# Patient Record
Sex: FEMALE | Race: OTHER | Employment: OTHER | URBAN - METROPOLITAN AREA
[De-identification: names, ages, dates, MRNs, and addresses within clinical notes are randomized per-mention and may not be internally consistent; named-entity substitution may affect disease eponyms.]

---

## 2023-05-01 ENCOUNTER — HOSPITAL ENCOUNTER (INPATIENT)
Age: 73
LOS: 2 days | Discharge: HOME | DRG: 641 | End: 2023-05-03
Attending: EMERGENCY MEDICINE | Admitting: INTERNAL MEDICINE
Payer: MEDICARE

## 2023-05-01 ENCOUNTER — APPOINTMENT (OUTPATIENT)
Dept: MRI IMAGING | Age: 73
DRG: 641 | End: 2023-05-01
Payer: MEDICARE

## 2023-05-01 ENCOUNTER — APPOINTMENT (OUTPATIENT)
Dept: CT IMAGING | Age: 73
DRG: 641 | End: 2023-05-01
Payer: MEDICARE

## 2023-05-01 ENCOUNTER — APPOINTMENT (OUTPATIENT)
Dept: GENERAL RADIOLOGY | Age: 73
DRG: 641 | End: 2023-05-01
Payer: MEDICARE

## 2023-05-01 DIAGNOSIS — R42 VERTIGO: Primary | ICD-10-CM

## 2023-05-01 DIAGNOSIS — R27.0 ATAXIA: ICD-10-CM

## 2023-05-01 PROBLEM — R07.9 CHEST PAIN: Status: ACTIVE | Noted: 2023-05-01

## 2023-05-01 LAB
ALBUMIN SERPL-MCNC: 4.1 G/DL (ref 3.4–5)
ALP SERPL-CCNC: 86 U/L (ref 40–129)
ALT SERPL-CCNC: 20 U/L (ref 10–40)
ANION GAP SERPL CALCULATED.3IONS-SCNC: 12 MMOL/L (ref 3–16)
AST SERPL-CCNC: 26 U/L (ref 15–37)
BASOPHILS # BLD: 0 K/UL (ref 0–0.2)
BASOPHILS NFR BLD: 0.4 %
BILIRUB DIRECT SERPL-MCNC: <0.2 MG/DL (ref 0–0.3)
BILIRUB INDIRECT SERPL-MCNC: NORMAL MG/DL (ref 0–1)
BILIRUB SERPL-MCNC: <0.2 MG/DL (ref 0–1)
BUN SERPL-MCNC: 23 MG/DL (ref 7–20)
CALCIUM SERPL-MCNC: 9.2 MG/DL (ref 8.3–10.6)
CHLORIDE SERPL-SCNC: 104 MMOL/L (ref 99–110)
CO2 SERPL-SCNC: 25 MMOL/L (ref 21–32)
CREAT SERPL-MCNC: 0.7 MG/DL (ref 0.6–1.2)
CRP SERPL-MCNC: <3 MG/L (ref 0–5.1)
DEPRECATED RDW RBC AUTO: 18.1 % (ref 12.4–15.4)
EKG ATRIAL RATE: 80 BPM
EKG DIAGNOSIS: NORMAL
EKG P AXIS: 73 DEGREES
EKG P-R INTERVAL: 198 MS
EKG Q-T INTERVAL: 408 MS
EKG QRS DURATION: 100 MS
EKG QTC CALCULATION (BAZETT): 470 MS
EKG R AXIS: 43 DEGREES
EKG T AXIS: 57 DEGREES
EKG VENTRICULAR RATE: 80 BPM
EOSINOPHIL # BLD: 0.3 K/UL (ref 0–0.6)
EOSINOPHIL NFR BLD: 2.9 %
GFR SERPLBLD CREATININE-BSD FMLA CKD-EPI: >60 ML/MIN/{1.73_M2}
GLUCOSE SERPL-MCNC: 121 MG/DL (ref 70–99)
HCT VFR BLD AUTO: 37.3 % (ref 36–48)
HGB BLD-MCNC: 12.6 G/DL (ref 12–16)
LIPASE SERPL-CCNC: 87 U/L (ref 13–60)
LYMPHOCYTES # BLD: 1.4 K/UL (ref 1–5.1)
LYMPHOCYTES NFR BLD: 15.1 %
MAGNESIUM SERPL-MCNC: 2.1 MG/DL (ref 1.8–2.4)
MCH RBC QN AUTO: 31.1 PG (ref 26–34)
MCHC RBC AUTO-ENTMCNC: 33.9 G/DL (ref 31–36)
MCV RBC AUTO: 92 FL (ref 80–100)
MONOCYTES # BLD: 0.6 K/UL (ref 0–1.3)
MONOCYTES NFR BLD: 6.2 %
NEUTROPHILS # BLD: 7.1 K/UL (ref 1.7–7.7)
NEUTROPHILS NFR BLD: 75.4 %
PHOSPHATE SERPL-MCNC: 3.6 MG/DL (ref 2.5–4.9)
PLATELET # BLD AUTO: 540 K/UL (ref 135–450)
PMV BLD AUTO: 7.9 FL (ref 5–10.5)
POTASSIUM SERPL-SCNC: 4.1 MMOL/L (ref 3.5–5.1)
PROT SERPL-MCNC: 6.7 G/DL (ref 6.4–8.2)
RBC # BLD AUTO: 4.05 M/UL (ref 4–5.2)
SODIUM SERPL-SCNC: 141 MMOL/L (ref 136–145)
TROPONIN, HIGH SENSITIVITY: 10 NG/L (ref 0–14)
TROPONIN, HIGH SENSITIVITY: 10 NG/L (ref 0–14)
TROPONIN, HIGH SENSITIVITY: 9 NG/L (ref 0–14)
WBC # BLD AUTO: 9.3 K/UL (ref 4–11)

## 2023-05-01 PROCEDURE — 93005 ELECTROCARDIOGRAM TRACING: CPT | Performed by: EMERGENCY MEDICINE

## 2023-05-01 PROCEDURE — 70450 CT HEAD/BRAIN W/O DYE: CPT

## 2023-05-01 PROCEDURE — 6360000002 HC RX W HCPCS

## 2023-05-01 PROCEDURE — 99285 EMERGENCY DEPT VISIT HI MDM: CPT

## 2023-05-01 PROCEDURE — 83735 ASSAY OF MAGNESIUM: CPT

## 2023-05-01 PROCEDURE — 80048 BASIC METABOLIC PNL TOTAL CA: CPT

## 2023-05-01 PROCEDURE — 6370000000 HC RX 637 (ALT 250 FOR IP)

## 2023-05-01 PROCEDURE — 86140 C-REACTIVE PROTEIN: CPT

## 2023-05-01 PROCEDURE — 80076 HEPATIC FUNCTION PANEL: CPT

## 2023-05-01 PROCEDURE — 84100 ASSAY OF PHOSPHORUS: CPT

## 2023-05-01 PROCEDURE — 36415 COLL VENOUS BLD VENIPUNCTURE: CPT

## 2023-05-01 PROCEDURE — 70551 MRI BRAIN STEM W/O DYE: CPT

## 2023-05-01 PROCEDURE — 83690 ASSAY OF LIPASE: CPT

## 2023-05-01 PROCEDURE — 85025 COMPLETE CBC W/AUTO DIFF WBC: CPT

## 2023-05-01 PROCEDURE — 6360000004 HC RX CONTRAST MEDICATION: Performed by: EMERGENCY MEDICINE

## 2023-05-01 PROCEDURE — 84484 ASSAY OF TROPONIN QUANT: CPT

## 2023-05-01 PROCEDURE — 6370000000 HC RX 637 (ALT 250 FOR IP): Performed by: EMERGENCY MEDICINE

## 2023-05-01 PROCEDURE — 70498 CT ANGIOGRAPHY NECK: CPT

## 2023-05-01 PROCEDURE — 2060000000 HC ICU INTERMEDIATE R&B

## 2023-05-01 PROCEDURE — 2580000003 HC RX 258

## 2023-05-01 PROCEDURE — 71046 X-RAY EXAM CHEST 2 VIEWS: CPT

## 2023-05-01 RX ORDER — 0.9 % SODIUM CHLORIDE 0.9 %
1000 INTRAVENOUS SOLUTION INTRAVENOUS ONCE
Status: COMPLETED | OUTPATIENT
Start: 2023-05-01 | End: 2023-05-01

## 2023-05-01 RX ORDER — SODIUM CHLORIDE 9 MG/ML
INJECTION, SOLUTION INTRAVENOUS CONTINUOUS
Status: DISCONTINUED | OUTPATIENT
Start: 2023-05-01 | End: 2023-05-01

## 2023-05-01 RX ORDER — ONDANSETRON 2 MG/ML
4 INJECTION INTRAMUSCULAR; INTRAVENOUS EVERY 6 HOURS PRN
Status: DISCONTINUED | OUTPATIENT
Start: 2023-05-01 | End: 2023-05-03 | Stop reason: HOSPADM

## 2023-05-01 RX ORDER — SODIUM CHLORIDE 0.9 % (FLUSH) 0.9 %
5-40 SYRINGE (ML) INJECTION EVERY 12 HOURS SCHEDULED
Status: DISCONTINUED | OUTPATIENT
Start: 2023-05-01 | End: 2023-05-03 | Stop reason: HOSPADM

## 2023-05-01 RX ORDER — ONDANSETRON 4 MG/1
4 TABLET, ORALLY DISINTEGRATING ORAL EVERY 8 HOURS PRN
Status: DISCONTINUED | OUTPATIENT
Start: 2023-05-01 | End: 2023-05-03 | Stop reason: HOSPADM

## 2023-05-01 RX ORDER — MECLIZINE HYDROCHLORIDE 25 MG/1
25 TABLET ORAL ONCE
Status: COMPLETED | OUTPATIENT
Start: 2023-05-01 | End: 2023-05-01

## 2023-05-01 RX ORDER — PANTOPRAZOLE SODIUM 40 MG/1
40 TABLET, DELAYED RELEASE ORAL
Status: DISCONTINUED | OUTPATIENT
Start: 2023-05-02 | End: 2023-05-03 | Stop reason: HOSPADM

## 2023-05-01 RX ORDER — ENOXAPARIN SODIUM 100 MG/ML
30 INJECTION SUBCUTANEOUS 2 TIMES DAILY
Status: DISCONTINUED | OUTPATIENT
Start: 2023-05-01 | End: 2023-05-03 | Stop reason: HOSPADM

## 2023-05-01 RX ORDER — MECOBALAMIN 5000 MCG
5 TABLET,DISINTEGRATING ORAL ONCE
Status: COMPLETED | OUTPATIENT
Start: 2023-05-01 | End: 2023-05-01

## 2023-05-01 RX ORDER — HYDROXYUREA 500 MG/1
1000 CAPSULE ORAL DAILY
Status: DISCONTINUED | OUTPATIENT
Start: 2023-05-02 | End: 2023-05-03 | Stop reason: HOSPADM

## 2023-05-01 RX ORDER — SODIUM CHLORIDE 9 MG/ML
INJECTION, SOLUTION INTRAVENOUS PRN
Status: DISCONTINUED | OUTPATIENT
Start: 2023-05-01 | End: 2023-05-03 | Stop reason: HOSPADM

## 2023-05-01 RX ORDER — POLYETHYLENE GLYCOL 3350 17 G/17G
17 POWDER, FOR SOLUTION ORAL DAILY PRN
Status: DISCONTINUED | OUTPATIENT
Start: 2023-05-01 | End: 2023-05-03 | Stop reason: HOSPADM

## 2023-05-01 RX ORDER — SODIUM CHLORIDE 0.9 % (FLUSH) 0.9 %
5-40 SYRINGE (ML) INJECTION PRN
Status: DISCONTINUED | OUTPATIENT
Start: 2023-05-01 | End: 2023-05-03 | Stop reason: HOSPADM

## 2023-05-01 RX ORDER — LISINOPRIL 10 MG/1
10 TABLET ORAL DAILY
Status: DISCONTINUED | OUTPATIENT
Start: 2023-05-02 | End: 2023-05-03 | Stop reason: HOSPADM

## 2023-05-01 RX ORDER — ACETAMINOPHEN 325 MG/1
650 TABLET ORAL EVERY 6 HOURS PRN
Status: DISCONTINUED | OUTPATIENT
Start: 2023-05-01 | End: 2023-05-03 | Stop reason: HOSPADM

## 2023-05-01 RX ORDER — ACETAMINOPHEN 650 MG/1
650 SUPPOSITORY RECTAL EVERY 6 HOURS PRN
Status: DISCONTINUED | OUTPATIENT
Start: 2023-05-01 | End: 2023-05-03 | Stop reason: HOSPADM

## 2023-05-01 RX ORDER — SODIUM CHLORIDE 9 MG/ML
INJECTION, SOLUTION INTRAVENOUS CONTINUOUS
Status: ACTIVE | OUTPATIENT
Start: 2023-05-01 | End: 2023-05-02

## 2023-05-01 RX ORDER — ENOXAPARIN SODIUM 100 MG/ML
40 INJECTION SUBCUTANEOUS DAILY
Status: DISCONTINUED | OUTPATIENT
Start: 2023-05-01 | End: 2023-05-01 | Stop reason: DRUGHIGH

## 2023-05-01 RX ORDER — LISINOPRIL 10 MG/1
10 TABLET ORAL DAILY
COMMUNITY

## 2023-05-01 RX ADMIN — SODIUM CHLORIDE 1000 ML: 9 INJECTION, SOLUTION INTRAVENOUS at 12:33

## 2023-05-01 RX ADMIN — SODIUM CHLORIDE, PRESERVATIVE FREE 10 ML: 5 INJECTION INTRAVENOUS at 21:43

## 2023-05-01 RX ADMIN — ENOXAPARIN SODIUM 30 MG: 100 INJECTION SUBCUTANEOUS at 21:42

## 2023-05-01 RX ADMIN — ACETAMINOPHEN 650 MG: 325 TABLET ORAL at 21:43

## 2023-05-01 RX ADMIN — SODIUM CHLORIDE: 9 INJECTION, SOLUTION INTRAVENOUS at 16:09

## 2023-05-01 RX ADMIN — IOPAMIDOL 75 ML: 755 INJECTION, SOLUTION INTRAVENOUS at 05:38

## 2023-05-01 RX ADMIN — MECLIZINE HYDROCHLORIDE 25 MG: 25 TABLET ORAL at 05:54

## 2023-05-01 RX ADMIN — ENOXAPARIN SODIUM 30 MG: 100 INJECTION SUBCUTANEOUS at 16:05

## 2023-05-01 RX ADMIN — SODIUM CHLORIDE, PRESERVATIVE FREE 10 ML: 5 INJECTION INTRAVENOUS at 12:31

## 2023-05-01 RX ADMIN — Medication 5 MG: at 23:04

## 2023-05-01 ASSESSMENT — PAIN DESCRIPTION - LOCATION
LOCATION: HEAD
LOCATION: HEAD

## 2023-05-01 ASSESSMENT — ENCOUNTER SYMPTOMS
NAUSEA: 1
RESPIRATORY NEGATIVE: 1
EYES NEGATIVE: 1
ANAL BLEEDING: 0

## 2023-05-01 ASSESSMENT — PAIN DESCRIPTION - ORIENTATION
ORIENTATION: MID
ORIENTATION: MID

## 2023-05-01 ASSESSMENT — PAIN DESCRIPTION - DESCRIPTORS
DESCRIPTORS: ACHING
DESCRIPTORS: ACHING

## 2023-05-01 ASSESSMENT — PAIN DESCRIPTION - PAIN TYPE
TYPE: ACUTE PAIN
TYPE: ACUTE PAIN

## 2023-05-01 ASSESSMENT — PAIN - FUNCTIONAL ASSESSMENT
PAIN_FUNCTIONAL_ASSESSMENT: ACTIVITIES ARE NOT PREVENTED
PAIN_FUNCTIONAL_ASSESSMENT: ACTIVITIES ARE NOT PREVENTED
PAIN_FUNCTIONAL_ASSESSMENT: 0-10

## 2023-05-01 ASSESSMENT — LIFESTYLE VARIABLES
HOW MANY STANDARD DRINKS CONTAINING ALCOHOL DO YOU HAVE ON A TYPICAL DAY: PATIENT DOES NOT DRINK
HOW OFTEN DO YOU HAVE A DRINK CONTAINING ALCOHOL: NEVER

## 2023-05-01 ASSESSMENT — PAIN SCALES - GENERAL
PAINLEVEL_OUTOF10: 3
PAINLEVEL_OUTOF10: 5
PAINLEVEL_OUTOF10: 0

## 2023-05-01 ASSESSMENT — PAIN DESCRIPTION - ONSET
ONSET: GRADUAL
ONSET: GRADUAL

## 2023-05-01 ASSESSMENT — PAIN DESCRIPTION - FREQUENCY
FREQUENCY: INTERMITTENT
FREQUENCY: INTERMITTENT

## 2023-05-01 NOTE — H&P
Internal Medicine - History & Physical     Date:  2023   Patient: Avril Amaral   Patient : 1950      CC:  Dizziness    Subjective     History Obtained From:  patient, electronic medical record    HPI:  Avril Amaral is a 72 y.o. female who presented to the ED on 2023 due to dizziness. She has a PMHx notable for HTN, GERD, and thrombocytosis on hydroxyurea. Patient is Citizen of [place] speaking and does understand some English and can communicate some in [place]. Son in the room helped translated some additional words and phrases for patient understanding. Patient presented due to 2 days of dizziness. She came into town on Friday from [place] to visit her son and his family. At that time she was asymptomatic. At baseline, patient does not have any dizziness. She works in [employer] at [employer] in [place] for about 4-5hrs / day and lives independently. She does not feel like the room is spinning. The dizziness is most pronounced when she is getting up from a lying position. She doesn't have any change in vision. She had a bit of nausea and vomited once in the ED earlier but is not having any nausea now and is asking when she can eat breakfast. She jokingly said that her appetite has not been affected the past 2 days. She reports the dizziness starting to affect her balance slightly but she can still walk if being careful. She had a headache yesterday but says it is mostly gone now. She denies any cough, rhinorrhea, fevers, chills, SOB, chest pain, palpitations, abdominal pain, changes in bowel movements, or dysuria. In the ED, initial BP was 205/106, HR 89, RR 18, SpO2 98% on room air. CXR and CT head w/o did not show any acute abnormality. CTA head neck w/ contrast showed calcific plaque of cavernous portions of the internal carotid arteries with stenosis. BMP and CBC were only remarkable for a platelet count 697.  Of note, patient has a chronic thrombocytosis which has improved with

## 2023-05-01 NOTE — ED NOTES
ED TO INPATIENT SBAR HANDOFF    Patient Name: Davina Montes De Oca   :  1950  72 y.o. MRN:  [id number]  Preferred Name  Shayy Nguyen  ED Room #:  G37/Z71-51  Family/Caregiver Present yes   Restraints no   Sitter no   Sepsis Risk Score Sepsis Risk Score: 0.52    Situation  Code Status: No Order No additional code details. Allergies: Patient has no known allergies. Weight: No data found. Arrived from: home  Chief Complaint:   Chief Complaint   Patient presents with    Emesis    Chest Pain     Pt began feeling lightheaded yesterday, woke up tonight vomiting and chest pain. Hospital Problem/Diagnosis:  Principal Problem:    Chest pain  Resolved Problems:    * No resolved hospital problems. *    Imaging:   CT HEAD WO CONTRAST   Final Result      1. No evidence of recent intracranial hemorrhage. 2. Mild parenchymal volume loss and chronic small vessel ischemic changes in the white matter. CTA HEAD NECK W CONTRAST   Final Result      1. 60% stenosis origin left internal carotid artery. PROCEDURE: CT ANGIOGRAPHY HEAD WITH/WITHOUT CONTRAST      INDICATION: headache, dizziness      COMPARISON: none      TECHNIQUE: Axial CT imaging obtained through the head prior to and following administration of IV contrast. Axial images, multiplanar reformatted images, and maximum intensity projection images were reviewed for CT angiographic technique. IV contrast: 75 mL Isovue 370. FINDINGS:      ANTERIOR CIRCULATION: Calcific plaque of cavernous portions of the internal carotid arteries with mild right and mild-to-moderate left internal carotid artery stenosis at of cavernous portions internal carotid arteries. POSTERIOR CIRCULATION: Calcific plaque of the intracranial left vertebral artery with moderate to high-grade stenosis. Patent right vertebral artery. Patent vertebral artery. Partial fetal origin of the left posterior cerebral artery. Normal right    posterior cerebral artery.       INTRACRANIAL VENOUS

## 2023-05-01 NOTE — ED PROVIDER NOTES
[address] [facility] [address] ENCOUNTER          ATTENDING PHYSICIAN NOTE       Date of evaluation: 5/1/2023    Chief Complaint     Emesis and Chest Pain (Pt began feeling lightheaded yesterday, woke up tonight vomiting and chest pain.)      History of Present Illness     Arnaud Cruz is a 72 y.o. female who presents to the emergency department complaining of dizziness. Patient states that over the last 2 days she has been having intermittent episodes of dizziness. She describes the dizziness as a sensation of things are moving forward and backward. She states that it happens when she changes positions. She does note mild headache associated with this. She denies any blurry vision or double vision. She denies any fevers or chills. She stated that she started to feel a sensation of chest pain in the left side of her chest which prompted her to come to the hospital.  She states she has had similar pain in the past that has been felt to be secondary to GI causes but was concerned in combination with the dizziness so came to the hospital.  She denies any numbness, tingling, weakness in the extremities. She denies any trauma. She has not tried taking anything for her symptoms. ASSESSMENT / PLAN  (MEDICAL DECISION MAKING)     INITIAL VITALS: BP: (!) 205/106,  , Heart Rate: 89, Resp: 18, SpO2: 98 %      Arnaud Cruz is a 72 y.o. female presents complaining of dizziness. Patient reports sensation of dizziness that she describes as things moving forward and backwards when she changes positions this been present for approximately 2 days. She states that yesterday and then today she started having difficulty walking because of the balance issues. She did have nausea and had 1 episode of vomiting in the ED lobby. She does note mild headache associated with this. She denies chest pain or shortness of breath.   On arrival, patient has no focal neurologic deficits though when we tried to ambulate her

## 2023-05-02 PROBLEM — I95.1 ORTHOSTATIC HYPOTENSION: Status: ACTIVE | Noted: 2023-05-02

## 2023-05-02 LAB
AMORPH SED URNS QL MICRO: NORMAL /HPF
ANION GAP SERPL CALCULATED.3IONS-SCNC: 9 MMOL/L (ref 3–16)
BASOPHILS # BLD: 0 K/UL (ref 0–0.2)
BASOPHILS NFR BLD: 0.7 %
BILIRUB UR QL STRIP.AUTO: NEGATIVE
BUN SERPL-MCNC: 13 MG/DL (ref 7–20)
CALCIUM SERPL-MCNC: 8.7 MG/DL (ref 8.3–10.6)
CHLORIDE SERPL-SCNC: 107 MMOL/L (ref 99–110)
CLARITY UR: CLEAR
CO2 SERPL-SCNC: 23 MMOL/L (ref 21–32)
COLOR UR: YELLOW
CREAT SERPL-MCNC: 0.7 MG/DL (ref 0.6–1.2)
DEPRECATED RDW RBC AUTO: 18.9 % (ref 12.4–15.4)
EOSINOPHIL # BLD: 0.3 K/UL (ref 0–0.6)
EOSINOPHIL NFR BLD: 5.1 %
EPI CELLS #/AREA URNS HPF: NORMAL /HPF (ref 0–5)
GFR SERPLBLD CREATININE-BSD FMLA CKD-EPI: >60 ML/MIN/{1.73_M2}
GLUCOSE SERPL-MCNC: 103 MG/DL (ref 70–99)
GLUCOSE UR STRIP.AUTO-MCNC: NEGATIVE MG/DL
HCT VFR BLD AUTO: 35 % (ref 36–48)
HGB BLD-MCNC: 11.6 G/DL (ref 12–16)
HGB UR QL STRIP.AUTO: NEGATIVE
KETONES UR STRIP.AUTO-MCNC: NEGATIVE MG/DL
LEUKOCYTE ESTERASE UR QL STRIP.AUTO: NEGATIVE
LV EF: 58 %
LVEF MODALITY: NORMAL
LYMPHOCYTES # BLD: 1.9 K/UL (ref 1–5.1)
LYMPHOCYTES NFR BLD: 32.9 %
MAGNESIUM SERPL-MCNC: 1.8 MG/DL (ref 1.8–2.4)
MCH RBC QN AUTO: 30.6 PG (ref 26–34)
MCHC RBC AUTO-ENTMCNC: 33.2 G/DL (ref 31–36)
MCV RBC AUTO: 92.2 FL (ref 80–100)
MONOCYTES # BLD: 0.5 K/UL (ref 0–1.3)
MONOCYTES NFR BLD: 8.9 %
NEUTROPHILS # BLD: 3.1 K/UL (ref 1.7–7.7)
NEUTROPHILS NFR BLD: 52.4 %
NITRITE UR QL STRIP.AUTO: NEGATIVE
PH UR STRIP.AUTO: 6.5 [PH] (ref 5–8)
PLATELET # BLD AUTO: 531 K/UL (ref 135–450)
PMV BLD AUTO: 8 FL (ref 5–10.5)
POTASSIUM SERPL-SCNC: 4.1 MMOL/L (ref 3.5–5.1)
PROT UR STRIP.AUTO-MCNC: NEGATIVE MG/DL
RBC # BLD AUTO: 3.79 M/UL (ref 4–5.2)
RBC #/AREA URNS HPF: NORMAL /HPF (ref 0–4)
SODIUM SERPL-SCNC: 139 MMOL/L (ref 136–145)
SP GR UR STRIP.AUTO: 1.02 (ref 1–1.03)
UA DIPSTICK W REFLEX MICRO PNL UR: NORMAL
URN SPEC COLLECT METH UR: NORMAL
UROBILINOGEN UR STRIP-ACNC: 0.2 E.U./DL
WBC # BLD AUTO: 5.8 K/UL (ref 4–11)
WBC #/AREA URNS HPF: NORMAL /HPF (ref 0–5)

## 2023-05-02 PROCEDURE — 97530 THERAPEUTIC ACTIVITIES: CPT

## 2023-05-02 PROCEDURE — 97161 PT EVAL LOW COMPLEX 20 MIN: CPT

## 2023-05-02 PROCEDURE — 82570 ASSAY OF URINE CREATININE: CPT

## 2023-05-02 PROCEDURE — 84156 ASSAY OF PROTEIN URINE: CPT

## 2023-05-02 PROCEDURE — 93306 TTE W/DOPPLER COMPLETE: CPT

## 2023-05-02 PROCEDURE — 83735 ASSAY OF MAGNESIUM: CPT

## 2023-05-02 PROCEDURE — 6370000000 HC RX 637 (ALT 250 FOR IP)

## 2023-05-02 PROCEDURE — 2580000003 HC RX 258

## 2023-05-02 PROCEDURE — 97116 GAIT TRAINING THERAPY: CPT

## 2023-05-02 PROCEDURE — 81001 URINALYSIS AUTO W/SCOPE: CPT

## 2023-05-02 PROCEDURE — 2580000003 HC RX 258: Performed by: INTERNAL MEDICINE

## 2023-05-02 PROCEDURE — 36415 COLL VENOUS BLD VENIPUNCTURE: CPT

## 2023-05-02 PROCEDURE — 85025 COMPLETE CBC W/AUTO DIFF WBC: CPT

## 2023-05-02 PROCEDURE — 6360000002 HC RX W HCPCS

## 2023-05-02 PROCEDURE — 80048 BASIC METABOLIC PNL TOTAL CA: CPT

## 2023-05-02 PROCEDURE — 97165 OT EVAL LOW COMPLEX 30 MIN: CPT

## 2023-05-02 PROCEDURE — 2060000000 HC ICU INTERMEDIATE R&B

## 2023-05-02 RX ORDER — 0.9 % SODIUM CHLORIDE 0.9 %
500 INTRAVENOUS SOLUTION INTRAVENOUS ONCE
Status: COMPLETED | OUTPATIENT
Start: 2023-05-02 | End: 2023-05-02

## 2023-05-02 RX ORDER — MECOBALAMIN 5000 MCG
5 TABLET,DISINTEGRATING ORAL ONCE
Status: COMPLETED | OUTPATIENT
Start: 2023-05-02 | End: 2023-05-02

## 2023-05-02 RX ORDER — IBUPROFEN 400 MG/1
400 TABLET ORAL EVERY 6 HOURS PRN
Status: DISCONTINUED | OUTPATIENT
Start: 2023-05-02 | End: 2023-05-03 | Stop reason: HOSPADM

## 2023-05-02 RX ADMIN — SODIUM CHLORIDE, PRESERVATIVE FREE 10 ML: 5 INJECTION INTRAVENOUS at 20:10

## 2023-05-02 RX ADMIN — Medication 5 MG: at 21:43

## 2023-05-02 RX ADMIN — ENOXAPARIN SODIUM 30 MG: 100 INJECTION SUBCUTANEOUS at 20:11

## 2023-05-02 RX ADMIN — LISINOPRIL 10 MG: 10 TABLET ORAL at 08:08

## 2023-05-02 RX ADMIN — ENOXAPARIN SODIUM 30 MG: 100 INJECTION SUBCUTANEOUS at 08:07

## 2023-05-02 RX ADMIN — IBUPROFEN 400 MG: 400 TABLET, FILM COATED ORAL at 08:28

## 2023-05-02 RX ADMIN — HYDROXYUREA 1000 MG: 500 CAPSULE ORAL at 08:08

## 2023-05-02 RX ADMIN — IBUPROFEN 400 MG: 400 TABLET, FILM COATED ORAL at 20:08

## 2023-05-02 RX ADMIN — SODIUM CHLORIDE: 9 INJECTION, SOLUTION INTRAVENOUS at 03:45

## 2023-05-02 RX ADMIN — SODIUM CHLORIDE 500 ML: 9 INJECTION, SOLUTION INTRAVENOUS at 12:37

## 2023-05-02 RX ADMIN — PANTOPRAZOLE SODIUM 40 MG: 40 TABLET, DELAYED RELEASE ORAL at 06:17

## 2023-05-02 ASSESSMENT — PAIN SCALES - GENERAL
PAINLEVEL_OUTOF10: 3
PAINLEVEL_OUTOF10: 0
PAINLEVEL_OUTOF10: 2
PAINLEVEL_OUTOF10: 0
PAINLEVEL_OUTOF10: 0

## 2023-05-02 ASSESSMENT — PAIN DESCRIPTION - LOCATION
LOCATION: HEAD
LOCATION: HEAD

## 2023-05-02 ASSESSMENT — PAIN - FUNCTIONAL ASSESSMENT: PAIN_FUNCTIONAL_ASSESSMENT: ACTIVITIES ARE NOT PREVENTED

## 2023-05-02 ASSESSMENT — PAIN DESCRIPTION - FREQUENCY: FREQUENCY: INTERMITTENT

## 2023-05-02 ASSESSMENT — PAIN DESCRIPTION - ONSET: ONSET: GRADUAL

## 2023-05-02 ASSESSMENT — PAIN DESCRIPTION - PAIN TYPE: TYPE: ACUTE PAIN

## 2023-05-02 ASSESSMENT — PAIN DESCRIPTION - ORIENTATION: ORIENTATION: MID

## 2023-05-02 ASSESSMENT — PAIN DESCRIPTION - DESCRIPTORS: DESCRIPTORS: ACHING

## 2023-05-02 NOTE — PLAN OF CARE
Problem: Pain  Goal: Verbalizes/displays adequate comfort level or baseline comfort level  5/2/2023 0551 by Maria Eugenia Hernandez, RN  Outcome: Progressing  Flowsheets (Taken 5/1/2023 0956 by Rubio Miller RN)  Verbalizes/displays adequate comfort level or baseline comfort level: Encourage patient to monitor pain and request assistance

## 2023-05-02 NOTE — PLAN OF CARE
Problem: Discharge Planning  Goal: Discharge to home or other facility with appropriate resources  Outcome: Progressing  Flowsheets (Taken 5/1/2023 0956)  Discharge to home or other facility with appropriate resources: Identify barriers to discharge with patient and caregiver     Problem: Pain  Goal: Verbalizes/displays adequate comfort level or baseline comfort level  Outcome: Progressing  Flowsheets (Taken 5/1/2023 0956)  Verbalizes/displays adequate comfort level or baseline comfort level: Encourage patient to monitor pain and request assistance     Problem: ABCDS Injury Assessment  Goal: Absence of physical injury  Outcome: Progressing  Flowsheets (Taken 5/1/2023 1009)  Absence of Physical Injury: Implement safety measures based on patient assessment     Problem: Safety - Adult  Goal: Free from fall injury  Outcome: Progressing

## 2023-05-02 NOTE — PLAN OF CARE
Problem: Discharge Planning  Goal: Discharge to home or other facility with appropriate resources  Outcome: Progressing     Problem: Pain  Goal: Verbalizes/displays adequate comfort level or baseline comfort level  5/2/2023 1644 by Bonilla Brewer RN  Outcome: Progressing  5/2/2023 0551 by Negar Hanks RN  Outcome: Progressing  Flowsheets (Taken 5/1/2023 0956 by Laura Jacinto, RN)  Verbalizes/displays adequate comfort level or baseline comfort level: Encourage patient to monitor pain and request assistance     Problem: ABCDS Injury Assessment  Goal: Absence of physical injury  Outcome: Progressing     Problem: Safety - Adult  Goal: Free from fall injury  Outcome: Progressing     Problem: Neurosensory - Adult  Goal: Achieves stable or improved neurological status  Outcome: Progressing  Goal: Absence of seizures  Outcome: Progressing  Goal: Remains free of injury related to seizures activity  Outcome: Progressing  Goal: Achieves maximal functionality and self care  Outcome: Progressing     Problem: Respiratory - Adult  Goal: Achieves optimal ventilation and oxygenation  Outcome: Progressing     Problem: Cardiovascular - Adult  Goal: Maintains optimal cardiac output and hemodynamic stability  Outcome: Progressing  Goal: Absence of cardiac dysrhythmias or at baseline  Outcome: Progressing     Problem: Skin/Tissue Integrity - Adult  Goal: Skin integrity remains intact  Outcome: Progressing  Goal: Incisions, wounds, or drain sites healing without S/S of infection  Outcome: Progressing  Goal: Oral mucous membranes remain intact  Outcome: Progressing     Problem: Musculoskeletal - Adult  Goal: Return mobility to safest level of function  Outcome: Progressing  Goal: Maintain proper alignment of affected body part  Outcome: Progressing  Goal: Return ADL status to a safe level of function  Outcome: Progressing     Problem: Gastrointestinal - Adult  Goal: Minimal or absence of nausea and vomiting  Outcome:

## 2023-05-02 NOTE — PLAN OF CARE
Problem: Discharge Planning  Goal: Discharge to home or other facility with appropriate resources  5/2/2023 0233 by Jonny Mcclendon RN  Outcome: Progressing  Flowsheets (Taken 5/2/2023 0232)  Discharge to home or other facility with appropriate resources:   Identify barriers to discharge with patient and caregiver   Arrange for needed discharge resources and transportation as appropriate   Arrange for interpreters to assist at discharge as needed   Identify discharge learning needs (meds, wound care, etc)   Refer to discharge planning if patient needs post-hospital services based on physician order or complex needs related to functional status, cognitive ability or social support system  Note: Identify barriers to discharge. Problem: Safety - Adult  Goal: Free from fall injury  5/2/2023 0233 by Jonny Mcclendon RN  Outcome: Progressing  Flowsheets (Taken 5/2/2023 9235)  Free From Fall Injury:   Based on caregiver fall risk screen, instruct family/caregiver to ask for assistance with transferring infant if caregiver noted to have fall risk factors   Instruct family/caregiver on patient safety  Note: Safety precautions/fall prevention in place. Problem: Neurosensory - Adult  Goal: Achieves stable or improved neurological status  Outcome: Progressing  Flowsheets (Taken 5/2/2023 0233)  Achieves stable or improved neurological status:   Assess for and report changes in neurological status   Maintain blood pressure and fluid volume within ordered parameters to optimize cerebral perfusion and minimize risk of hemorrhage   Initiate measures to prevent increased intracranial pressure   Monitor temperature, glucose, and sodium. Initiate appropriate interventions as ordered  Note: Monitor neuro status and orthostatic hypotension.

## 2023-05-03 VITALS
HEART RATE: 72 BPM | RESPIRATION RATE: 16 BRPM | OXYGEN SATURATION: 97 % | WEIGHT: 266.76 LBS | TEMPERATURE: 97.9 F | SYSTOLIC BLOOD PRESSURE: 112 MMHG | HEIGHT: 69 IN | DIASTOLIC BLOOD PRESSURE: 61 MMHG | BODY MASS INDEX: 39.51 KG/M2

## 2023-05-03 LAB
ANION GAP SERPL CALCULATED.3IONS-SCNC: 9 MMOL/L (ref 3–16)
BASOPHILS # BLD: 0 K/UL (ref 0–0.2)
BASOPHILS NFR BLD: 0.7 %
BUN SERPL-MCNC: 15 MG/DL (ref 7–20)
CALCIUM SERPL-MCNC: 9 MG/DL (ref 8.3–10.6)
CHLORIDE SERPL-SCNC: 108 MMOL/L (ref 99–110)
CO2 SERPL-SCNC: 25 MMOL/L (ref 21–32)
CREAT SERPL-MCNC: 0.7 MG/DL (ref 0.6–1.2)
CREAT UR-MCNC: 81.2 MG/DL (ref 28–259)
DEPRECATED RDW RBC AUTO: 18.2 % (ref 12.4–15.4)
EOSINOPHIL # BLD: 0.3 K/UL (ref 0–0.6)
EOSINOPHIL NFR BLD: 5.2 %
FOLATE SERPL-MCNC: >20 NG/ML (ref 4.78–24.2)
GFR SERPLBLD CREATININE-BSD FMLA CKD-EPI: >60 ML/MIN/{1.73_M2}
GLUCOSE SERPL-MCNC: 107 MG/DL (ref 70–99)
HCT VFR BLD AUTO: 34.1 % (ref 36–48)
HGB BLD-MCNC: 11.5 G/DL (ref 12–16)
LYMPHOCYTES # BLD: 1.9 K/UL (ref 1–5.1)
LYMPHOCYTES NFR BLD: 31.8 %
MAGNESIUM SERPL-MCNC: 2.1 MG/DL (ref 1.8–2.4)
MCH RBC QN AUTO: 31.1 PG (ref 26–34)
MCHC RBC AUTO-ENTMCNC: 33.6 G/DL (ref 31–36)
MCV RBC AUTO: 92.5 FL (ref 80–100)
MONOCYTES # BLD: 0.6 K/UL (ref 0–1.3)
MONOCYTES NFR BLD: 9.4 %
NEUTROPHILS # BLD: 3.1 K/UL (ref 1.7–7.7)
NEUTROPHILS NFR BLD: 52.9 %
PLATELET # BLD AUTO: 512 K/UL (ref 135–450)
PMV BLD AUTO: 8 FL (ref 5–10.5)
POTASSIUM SERPL-SCNC: 4.6 MMOL/L (ref 3.5–5.1)
PROT UR-MCNC: 5 MG/DL
PROT/CREAT UR-RTO: 0.1 MG/DL
RBC # BLD AUTO: 3.68 M/UL (ref 4–5.2)
SODIUM SERPL-SCNC: 142 MMOL/L (ref 136–145)
WBC # BLD AUTO: 6 K/UL (ref 4–11)

## 2023-05-03 PROCEDURE — 36415 COLL VENOUS BLD VENIPUNCTURE: CPT

## 2023-05-03 PROCEDURE — 83735 ASSAY OF MAGNESIUM: CPT

## 2023-05-03 PROCEDURE — 80048 BASIC METABOLIC PNL TOTAL CA: CPT

## 2023-05-03 PROCEDURE — 82746 ASSAY OF FOLIC ACID SERUM: CPT

## 2023-05-03 PROCEDURE — 2580000003 HC RX 258

## 2023-05-03 PROCEDURE — 99221 1ST HOSP IP/OBS SF/LOW 40: CPT | Performed by: OTOLARYNGOLOGY

## 2023-05-03 PROCEDURE — 6370000000 HC RX 637 (ALT 250 FOR IP)

## 2023-05-03 PROCEDURE — 6360000002 HC RX W HCPCS

## 2023-05-03 PROCEDURE — 6370000000 HC RX 637 (ALT 250 FOR IP): Performed by: INTERNAL MEDICINE

## 2023-05-03 PROCEDURE — 85025 COMPLETE CBC W/AUTO DIFF WBC: CPT

## 2023-05-03 RX ORDER — MECLIZINE HYDROCHLORIDE 25 MG/1
25 TABLET ORAL 3 TIMES DAILY PRN
Qty: 15 TABLET | Refills: 0 | Status: SHIPPED | OUTPATIENT
Start: 2023-05-03 | End: 2023-05-13

## 2023-05-03 RX ORDER — ASPIRIN 81 MG/1
81 TABLET ORAL DAILY
Qty: 30 TABLET | Refills: 3 | Status: SHIPPED | OUTPATIENT
Start: 2023-05-03

## 2023-05-03 RX ORDER — MECLIZINE HYDROCHLORIDE 25 MG/1
25 TABLET ORAL ONCE
Status: COMPLETED | OUTPATIENT
Start: 2023-05-03 | End: 2023-05-03

## 2023-05-03 RX ADMIN — HYDROXYUREA 1000 MG: 500 CAPSULE ORAL at 08:00

## 2023-05-03 RX ADMIN — MECLIZINE HYDROCHLORIDE 25 MG: 25 TABLET ORAL at 10:59

## 2023-05-03 RX ADMIN — ENOXAPARIN SODIUM 30 MG: 100 INJECTION SUBCUTANEOUS at 07:59

## 2023-05-03 RX ADMIN — PANTOPRAZOLE SODIUM 40 MG: 40 TABLET, DELAYED RELEASE ORAL at 06:22

## 2023-05-03 RX ADMIN — SODIUM CHLORIDE, PRESERVATIVE FREE 10 ML: 5 INJECTION INTRAVENOUS at 07:59

## 2023-05-03 RX ADMIN — LISINOPRIL 10 MG: 10 TABLET ORAL at 07:59

## 2023-05-03 ASSESSMENT — PAIN SCALES - GENERAL
PAINLEVEL_OUTOF10: 0
PAINLEVEL_OUTOF10: 5

## 2023-05-03 ASSESSMENT — PAIN DESCRIPTION - LOCATION: LOCATION: HEAD

## 2023-05-03 ASSESSMENT — PAIN DESCRIPTION - DESCRIPTORS: DESCRIPTORS: ACHING

## 2023-05-03 NOTE — DISCHARGE INSTR - DIET

## 2023-05-03 NOTE — PROGRESS NOTES
4 Eyes Skin Assessment       NAME:  Gus Flores  YOB: 1950  MEDICAL RECORD NUMBER:  [id number]       The patient is being assessed for   Admission       I agree that One RN has performed a thorough Head to Toe Skin Assessment on the patient. ALL assessment sites listed below have been assessed. Areas assessed by both nurses:       Head, Face, Ears, Shoulders, Back, Chest, Arms, Elbows, Hands, Sacrum. Buttock, Coccyx, Ischium, Legs. Feet and Heels, and Under Medical Devices                                Does the Patient have a Wound?  No noted wound(s)        Stef Prevention initiated by RN: No   Wound Care Orders initiated by RN: No       Pressure Injury (Stage 3,4, Unstageable, DTI, NWPT, and Complex wounds) if present, place referral order by RN under : No       New and Established Ostomies, if present place, referral order under : No        Nurse 1 eSignature: Electronically signed by Stormy Brink RN on 5/1/23 at 7:29 PM EDT       **SHARE this note so that the co-signing nurse can place an eSignature**       Nurse 2 eSignature: Electronically signed by Sondra Fleischer, RN on 5/1/23 at 8:46 PM EDT
Internal Medicine Progress Note    Admit Date: 2023  Hospital Day: 2   Patient name: Marbella Watkins   : 1950     CC:   Emesis and Chest Pain (Pt began feeling lightheaded yesterday, woke up tonight vomiting and chest pain.)     Interval history:   No acute events overnight. Patient seen sitting up in bed this morning. Orthostatic vitals were done this morning. Patient was standing for 3min before standing BP was done. Negative for orthostasis. Patient reports some lightheadedness while going to the bathroom this morning. She denies headache, changes in vision, rhinorrhea, cough, SOB, chest pain, nausea, vomiting, abdominal pain, diarrhea, constipation, or dysuria. Medications   Scheduled Meds:   hydroxyurea  1,000 mg Oral Daily    lisinopril  10 mg Oral Daily    sodium chloride flush  5-40 mL IntraVENous 2 times per day    pantoprazole  40 mg Oral QAM AC    enoxaparin  30 mg SubCUTAneous BID     Continuous Infusions:   sodium chloride      sodium chloride 100 mL/hr at 23 0345     Objective   Vitals  Patient Vitals for the past 8 hrs:   BP Temp Temp src Pulse Resp SpO2 Weight   23 0804 (!) 162/87 97.8 °F (36.6 °C) Axillary 79 17 96 % --   23 0600 -- -- -- 84 -- -- 264 lb 12.4 oz (120.1 kg)   23 0403 (!) 141/75 97.7 °F (36.5 °C) Axillary 75 16 96 % --   23 0200 -- -- -- 82 -- -- --       Intake/Output Summary (Last 24 hours) at 2023 0632  Last data filed at 2023  Gross per 24 hour   Intake 240 ml   Output --   Net 240 ml       ROS: A 10 point review of systems was conducted and significant findings are noted in the interval history. Physical Exam  Constitutional:       General: She is not in acute distress. Appearance: She is not ill-appearing. HENT:      Head: Normocephalic and atraumatic. Mouth/Throat:      Pharynx: Oropharynx is clear. Eyes:      Extraocular Movements: Extraocular movements intact.       Pupils: Pupils are equal,
Internal Medicine Progress Note    Admit Date: 2023  Hospital Day: 3   Patient name: Josesito Albarado   : 1950     CC:   Emesis and Chest Pain (Pt began feeling lightheaded yesterday, woke up tonight vomiting and chest pain.)     Interval history:   No acute events overnight. Patient walked around with PT yesterday and didn't have any dizziness. She scored 22/24. Her OT score was 24/24. Patient reported 2 episodes of dizziness yesterday later in the afternoon. She said one episode occurred while she was laying in bed. Patient denies changes in vision, rhinorrhea, cough, SOB, chest pain, nausea, vomiting, abdominal pain, diarrhea, constipation, or dysuria. Medications   Scheduled Meds:   hydroxyurea  1,000 mg Oral Daily    lisinopril  10 mg Oral Daily    sodium chloride flush  5-40 mL IntraVENous 2 times per day    pantoprazole  40 mg Oral QAM AC    enoxaparin  30 mg SubCUTAneous BID     Continuous Infusions:   sodium chloride       Objective   Vitals  Patient Vitals for the past 8 hrs:   BP Temp Temp src Pulse Resp SpO2 Weight   23 0745 (!) 151/89 97 °F (36.1 °C) Axillary 75 18 99 % --   23 0600 -- -- -- 64 -- -- --   23 0515 (!) 140/76 97.6 °F (36.4 °C) Axillary 71 18 99 % 266 lb 12.1 oz (121 kg)   23 0400 -- -- -- 68 -- -- --         Intake/Output Summary (Last 24 hours) at 5/3/2023 1011  Last data filed at 5/3/2023 0515  Gross per 24 hour   Intake 10 ml   Output 100 ml   Net -90 ml         ROS: A 10 point review of systems was conducted and significant findings are noted in the interval history. Physical Exam  Constitutional:       General: She is not in acute distress. Appearance: She is not ill-appearing. HENT:      Head: Normocephalic and atraumatic. Mouth/Throat:      Pharynx: Oropharynx is clear. Eyes:      Extraocular Movements: Extraocular movements intact. Pupils: Pupils are equal, round, and reactive to light.    Cardiovascular:      Rate
Pharmacist Review and Automatic Dose Adjustment of Prophylactic Enoxaparin         The reviewing pharmacist has made an adjustment to the ordered enoxaparin dose or converted to UFH per the approved [facility] and table as identified below. Fang Mtz is a 72 y.o. female. Recent Labs     05/01/23  0431   CREATININE 0.7       Estimated Creatinine Clearance: 102 mL/min (based on SCr of 0.7 mg/dL). Recent Labs     05/01/23  0431   HGB 12.6   HCT 37.3   *     No results for input(s): INR in the last 72 hours.     Height:   Ht Readings from Last 1 Encounters:   05/01/23 5' 9\" (1.753 m)     Weight:  Wt Readings from Last 1 Encounters:   05/01/23 264 lb 8.8 oz (120 kg)               Plan: Based upon the patient's weight and renal function    Ordered: Enoxaparin 40mg SUBQ Daily    Changed/converted to    New Order: Enoxaparin 30mg SUBQ BID      Thank you,  Adelina Grossman [id number], [facility]  5/1/2023, 12:26 PM
Physical Therapy  Facility/Department: [place] [address] PCU  Physical Therapy Initial Assessment and Discharge     Name: Sara Saravia  : 1950  MRN: [id number]  Date of Service: 2023    Discharge Recommendations:      PT Equipment Recommendations  Equipment Needed: No      Sara Saravia scored a 22/24 on the AM-PAC short mobility form. At this time, no further PT is recommended upon discharge. Recommend patient returns to prior setting with prior services. Patient Diagnosis(es): The primary encounter diagnosis was Vertigo. A diagnosis of Ataxia was also pertinent to this visit. Past Medical History:  has a past medical history of Hypertension. Past Surgical History:  has a past surgical history that includes back surgery. Assessment   Body Structures, Functions, Activity Limitations Requiring Skilled Therapeutic Intervention: Decreased functional mobility ; Vestibular Impairment  Assessment: Pt is a 73 y/o female ED admit on  presenting w/ c/o dizziness occuring w/ position changes. She has a PMHx notable for HTN, GERD, and thrombocytosis on hydroxyurea. CXR and CT head w/o Neg for acute abnormality. CTA head neck w/ contrast showed carotid arteries with stenosis. Janee-Hallpike in ED Negative as well. Prior to admission pt was I w/ everything. Currently she only requires Supervision w/ bed mobility and SBA w/ transfers and ambulation  due to concerns of OH and is effectively at her baseline level of function. Of note w/ position changes her BP fluctuated but w/o report of dizziness, nurse notified. Found initially in supine 169/87 and dropping to its lowest at 143/71 after ambulation. Also unable to reproduce dizziness w/ head turns. Pt does not require PT services while admitted and will be safe to D/c home when cleared by medical team. Will sign off.   Treatment Diagnosis: Impaired funcional mobility  Therapy Prognosis: Excellent  Decision Making: Low Complexity  No Skilled PT: No PT goals
Physician Progress Note      Mariela Armendariz  CSN #:                  [id number]  :                       1950  ADMIT DATE:       2023 3:48 AM  DISCH DATE:  RESPONDING  PROVIDER #:        Jaydon MARRERO          QUERY TEXT:    Patient admitted with dizziness due to Orthostatic hypotension. If possible,   please document in progress notes and discharge summary after study the   etiology of the syncope: The medical record reflects the following:  Risk Factors: Emesis  Clinical Indicators: BUN: 23- 13. Progress note  \"symptomatically feeling   better after fluids though some lingering light headedness continues. Orthostats neg after receiving fluids yesterday\". CTA: proximal right internal   carotid artery with 60% stenosis. Treatment: 1500 cc NS bolus, NS @ 100/hr, Antivert, CT/ CTA/ MRI  Options provided:  -- Dizziness due to Orthostatic hypotension secondary to dehydration  -- Dizziness due to  left internal carotid artery stenosis  -- Other - I will add my own diagnosis  -- Disagree - Not applicable / Not valid  -- Disagree - Clinically unable to determine / Unknown  -- Refer to Clinical Documentation Reviewer    PROVIDER RESPONSE TEXT:    The dizziness due to orthostatic hypotension secondary to dehydration. Query created by:  Zaid Suazo on 2023 4:37 PM      Electronically signed by:  Jaydon MARRERO 5/3/2023 1:01 PM
Patient  Education Provided: Role of Therapy;Plan of Care;Transfer Training;ADL Adaptive Strategies  Education Method: Demonstration;Verbal  Barriers to Learning: None  Education Outcome: Verbalized understanding;Demonstrated understanding                        G-Code     OutComes Score                                                  AM-PAC Score        AM-PAC Inpatient Daily Activity Raw Score: 24 (05/02/23 1330)  AM-PAC Inpatient ADL T-Scale Score : 57.54 (05/02/23 1330)  ADL Inpatient CMS 0-100% Score: 0 (05/02/23 1330)  ADL Inpatient CMS G-Code Modifier : [address] (05/02/23 1330)    Tinneti Score       Goals          Therapy Time   Individual Concurrent Group Co-treatment   Time In 0910         Time Out 0956         Minutes 46          Timed Code Treatment Minutes:  31    Total Treatment Minutes:  46 Thae Reina, OT

## 2023-05-03 NOTE — PLAN OF CARE
Problem: Discharge Planning  Goal: Discharge to home or other facility with appropriate resources  Outcome: Completed     Problem: Pain  Goal: Verbalizes/displays adequate comfort level or baseline comfort level  5/3/2023 1332 by Ninfa Gann RN  Outcome: Completed  5/3/2023 0132 by Marylynn Kocher, RN  Outcome: Progressing  Flowsheets (Taken 5/3/2023 0132)  Verbalizes/displays adequate comfort level or baseline comfort level:   Encourage patient to monitor pain and request assistance   Administer analgesics based on type and severity of pain and evaluate response   Assess pain using appropriate pain scale   Implement non-pharmacological measures as appropriate and evaluate response     Problem: ABCDS Injury Assessment  Goal: Absence of physical injury  Outcome: Completed     Problem: Safety - Adult  Goal: Free from fall injury  5/3/2023 1332 by Ninfa Gann RN  Outcome: Completed  5/3/2023 0132 by Marylynn Kocher, RN  Outcome: Progressing  Flowsheets (Taken 5/3/2023 0132)  Free From Fall Injury: Instruct family/caregiver on patient safety     Problem: Neurosensory - Adult  Goal: Achieves stable or improved neurological status  5/3/2023 1332 by Ninfa Gann RN  Outcome: Completed  5/3/2023 0132 by Marylynn Kocher, RN  Outcome: Progressing  Flowsheets (Taken 5/3/2023 0132)  Achieves stable or improved neurological status: Assess for and report changes in neurological status  Goal: Absence of seizures  Outcome: Completed  Goal: Remains free of injury related to seizures activity  Outcome: Completed  Goal: Achieves maximal functionality and self care  Outcome: Completed     Problem: Respiratory - Adult  Goal: Achieves optimal ventilation and oxygenation  5/3/2023 1332 by Ninfa Gann RN  Outcome: Completed  5/3/2023 0132 by Marylynn Kocher, RN  Outcome: Progressing  Flowsheets (Taken 5/3/2023 0132)  Achieves optimal ventilation and oxygenation:   Assess for changes in respiratory status   Assess for changes in mentation

## 2023-05-03 NOTE — DISCHARGE INSTRUCTIONS
Follow up with you primary care doctor within a week. If you do not have a primary care doctor, you can find anyone you would like to and go to them, or you can be seen at the [facility]. You can follow up with ENT, Dr. Angle Styles, within a week. If you would prefer, you can instead see a local ENT doctor in [place] near your home. Start taking aspirin 81mg once a day. Meclizine (Antivert) 25mg can be taken up to 3 times a day for dizziness. Continue taking your hydroxyurea (Hydrea) and lisinopril as you previously were. No changes were made to those.

## 2023-05-03 NOTE — PLAN OF CARE
Problem: Neurosensory - Adult  Goal: Achieves stable or improved neurological status  5/3/2023 0132 by Thang Keita RN  Outcome: Progressing  Flowsheets (Taken 5/3/2023 0132)  Achieves stable or improved neurological status: Assess for and report changes in neurological status     Problem: Respiratory - Adult  Goal: Achieves optimal ventilation and oxygenation  5/3/2023 0132 by Thang Keita RN  Outcome: Progressing  Flowsheets (Taken 5/3/2023 0132)  Achieves optimal ventilation and oxygenation:   Assess for changes in respiratory status   Assess for changes in mentation and behavior     Problem: Pain  Goal: Verbalizes/displays adequate comfort level or baseline comfort level  5/3/2023 0132 by Thang Keita RN  Outcome: Progressing  Flowsheets (Taken 5/3/2023 0132)  Verbalizes/displays adequate comfort level or baseline comfort level:   Encourage patient to monitor pain and request assistance   Administer analgesics based on type and severity of pain and evaluate response   Assess pain using appropriate pain scale   Implement non-pharmacological measures as appropriate and evaluate response     Problem: Cardiovascular - Adult  Goal: Maintains optimal cardiac output and hemodynamic stability  5/3/2023 0132 by Thang Keita RN  Outcome: Progressing  Flowsheets (Taken 5/3/2023 0132)  Maintains optimal cardiac output and hemodynamic stability:   Monitor blood pressure and heart rate   Assess for signs of decreased cardiac output   Administer fluid and/or volume expanders as ordered     Problem: Safety - Adult  Goal: Free from fall injury  5/3/2023 0132 by Thang Keita RN  Outcome: Progressing  Flowsheets (Taken 5/3/2023 0132)  Free From Fall Injury: Instruct family/caregiver on patient safety          Fall precautions are in place. Bed alarm is on and in lowest position. Pt is using call light appropriately. Will continue to monitor.

## 2023-05-03 NOTE — CONSULTS
Inpatient consult to Otolaryngology  Consult performed by: Isidro Gifford DO  Consult ordered by: Segundo Davison MD  Reason for consult: vertigo    [facility]      Patient Name: Josesito Albarado  Medical Record Number:  [id number]  Primary Care Physician:  No primary care provider on file. Date of Consultation: 5/3/2023    Chief Complaint: Dizziness    HISTORY OF PRESENT ILLNESS  Alona Carrera is a(n) 72 y.o. female who was admitted with emesis and chest pain and dizziness. Found to have orthostatic hypotension. Janee-Hallpike testing performed by internal medicine team and was found to be negative. We were asked to evaluate the patient for potential causes of dizziness. Patient has not limited [place] speaking ability. Most history was taken from the chart. Symptoms had improved since management of orthostatic hypotension and hypertension has been employed. No acute findings on MRI of the brain, CT head or CTA head and neck. No change in hearing. No otalgia or otorrhea. Dizziness occurred when she went from a sitting position from laying down. She did have some nausea. Had been going on for a few days before admission while she was visiting her son. Patient Active Problem List   Diagnosis    Chest pain    Dizziness    Orthostatic hypotension     Past Surgical History:   Procedure Laterality Date    BACK SURGERY       History reviewed. No pertinent family history.   Social History     Socioeconomic History    Marital status:      Spouse name: Not on file    Number of children: Not on file    Years of education: Not on file    Highest education level: Not on file   Occupational History    Not on file   Tobacco Use    Smoking status: Never    Smokeless tobacco: Never   Vaping Use    Vaping Use: Never used   Substance and Sexual Activity    Alcohol use: Never    Drug use: Never    Sexual activity: Not on file   Other Topics Concern    Not on file

## 2023-05-03 NOTE — DISCHARGE SUMMARY
INTERNAL MEDICINE DEPARTMENT AT [address] [place] [address]  DISCHARGE SUMMARY    Patient ID: Evelyne Valladares                                             Discharge Date: 5/3/2023   Patient's PCP: No primary care provider on file. Discharge Physician: Thomas Malik MD  Admit Date: 5/1/2023   Admitting Physician: Thomas Malik MD    PROBLEMS DURING HOSPITALIZATION:   Dizziness   Orthostatic hypotension      HPI:  Evelyne Valladares is a very pleasant 72 yr old female who presented to the ED on 5/1/2023 due to dizziness. She has a PMHx notable for HTN, GERD, and thrombocytosis on hydroxyurea. Patient is [place] speaking. She does understand most conversational English and can communicate back in [place]. Son in the room helped translated some additional words and phrases for patient understanding. Patient presented due to 2 days of dizziness. She came into town on Friday from [place] to visit her son and his family. At that time she was asymptomatic. At baseline, patient does not have any dizziness. She works in [employer] at and [employer] in [place] for about 4-5hrs / day and lives independently. She does not feel like the room is spinning. The dizziness is most pronounced when she is getting up from a lying position. She doesn't have any change in vision. She had a bit of nausea and vomited once in the ED earlier but is not having any nausea now and is asking when she can eat breakfast. She jokingly said that her appetite has not been affected the past 2 days. She reports the dizziness starting to affect her balance slightly but she can still walk if being careful. She had a headache yesterday but says it is mostly gone now. She denies any cough, rhinorrhea, fevers, chills, SOB, chest pain, palpitations, abdominal pain, changes in bowel movements, or dysuria. In the ED, initial BP was 205/106, HR 89, RR 18, SpO2 98% on room air.  CXR and CT head w/o did not show any acute